# Patient Record
(demographics unavailable — no encounter records)

---

## 2025-02-18 NOTE — PAST MEDICAL HISTORY
[Menopause Age____] : age at menopause was [unfilled] [Total Preg ___] : G: [unfilled] [Live Births___] : P: [unfilled] [Full Term ___] : Full Term: [unfilled]  [Living ___] : Living: [unfilled]

## 2025-02-18 NOTE — HISTORY OF PRESENT ILLNESS
[FreeTextEntry1] : Fasting labs and medication renewal  [de-identified] : Ms. HUERTA is a 62 year-old-female, with a past medical history as noted below, who present to the office today for fasting labs and medication renewal.

## 2025-02-18 NOTE — COUNSELING
[AUDIT-C Screening administered and reviewed] : AUDIT-C Screening administered and reviewed [Benefits of weight loss discussed] : Benefits of weight loss discussed [Encouraged to increase physical activity] : Encouraged to increase physical activity [Encouraged to use exercise tracking device] : Encouraged to use exercise tracking device [Good understanding] : Patient has a good understanding of disease, goals and obesity follow-up plan [FreeTextEntry2] : 1600 mark daily [FreeTextEntry4] : 7

## 2025-02-18 NOTE — PHYSICAL EXAM
[No Acute Distress] : no acute distress [Well Nourished] : well nourished [Well Developed] : well developed [Well-Appearing] : well-appearing [Normal Sclera/Conjunctiva] : normal sclera/conjunctiva [PERRL] : pupils equal round and reactive to light [EOMI] : extraocular movements intact [Normal Outer Ear/Nose] : the outer ears and nose were normal in appearance [Normal Oropharynx] : the oropharynx was normal [Normal TMs] : both tympanic membranes were normal [No JVD] : no jugular venous distention [No Lymphadenopathy] : no lymphadenopathy [Supple] : supple [Thyroid Normal, No Nodules] : the thyroid was normal and there were no nodules present [No Respiratory Distress] : no respiratory distress  [No Accessory Muscle Use] : no accessory muscle use [Clear to Auscultation] : lungs were clear to auscultation bilaterally [Normal Rate] : normal rate  [Regular Rhythm] : with a regular rhythm [Normal S1, S2] : normal S1 and S2 [No Carotid Bruits] : no carotid bruits [No Abdominal Bruit] : a ~M bruit was not heard ~T in the abdomen [Pedal Pulses Present] : the pedal pulses are present [No Edema] : there was no peripheral edema [No Palpable Aorta] : no palpable aorta [No Extremity Clubbing/Cyanosis] : no extremity clubbing/cyanosis [Soft] : abdomen soft [Non Tender] : non-tender [Non-distended] : non-distended [No Masses] : no abdominal mass palpated [No HSM] : no HSM [Normal Bowel Sounds] : normal bowel sounds [Declined Rectal Exam] : declined rectal exam [Normal Posterior Cervical Nodes] : no posterior cervical lymphadenopathy [Normal Anterior Cervical Nodes] : no anterior cervical lymphadenopathy [No CVA Tenderness] : no CVA  tenderness [No Spinal Tenderness] : no spinal tenderness [No Joint Swelling] : no joint swelling [Grossly Normal Strength/Tone] : grossly normal strength/tone [No Rash] : no rash [Coordination Grossly Intact] : coordination grossly intact [No Focal Deficits] : no focal deficits [Normal Gait] : normal gait [Deep Tendon Reflexes (DTR)] : deep tendon reflexes were 2+ and symmetric [Speech Grossly Normal] : speech grossly normal [Normal Affect] : the affect was normal [Alert and Oriented x3] : oriented to person, place, and time [Normal Mood] : the mood was normal [Normal Insight/Judgement] : insight and judgment were intact [de-identified] : spider veins in lower extremities bilaterally, right worse than left  [de-identified] : as per GYN  [de-identified] : obese [FreeTextEntry1] : as per gyn  [de-identified] : advised to follow up with GYN -

## 2025-02-18 NOTE — DATA REVIEWED
[FreeTextEntry1] : reviewed prior labs reviewed bds  11/24 normal bds reviewed  mammo  birad 1   done 11/2024

## 2025-02-18 NOTE — ASSESSMENT
[FreeTextEntry1] : Ms. HUERTA is a 62 year female, with a past medical history as noted above, who present to the office today for Physical exam and flu shot as well as fasting blood work.

## 2025-02-18 NOTE — PLAN
[FreeTextEntry1] : Vascular  history of DVT/antiphospholipid antibody syndrome - Reviewed EKG NSR- continue Isaiah Lose Dose 81 mg p.o. as directed - continue CV exercise (walking).  Monitor FLP.   Cardio Screening for CAD - Reviewed EKG NSR - Continue ASA daily.   Gynecology Continue to follow up with OB/GYN annually - Advised clinical breast exam by GYN - Request copy of mammogram when completed in 11/24.  Request Pap smear results.  Gastroenterology screening colonoscopy - hx colon polyp - Advised patient importance of repeating colonoscopy.  Patient states she has a scheduled appointment with Dr. Perlman to discuss and to schedule her colonoscopy  Ophthalmology-history of retinal detachment.  Following ophthalmology for routine eye exams. next ov 11/24  Adult BMI screening and follow-up:  The patient's BMI is about 33.  Counseled patient regarding BMI, healthy eating, portion control and exercise importance of diet to maintain a healthy weight.  Counseled patient on importance of exercise to maintain a healthy weight. Discussed tracking calories with my fitness [employer]ina.   Immunization - Flu vaccination discussed. Education provided -  Fluzone 0.5 mL administered intramuscularly in the deltoid area.  See consent form for lot number and expiration date.  Administration of vaccine was given by Monica Koenig registered nurse.   Patient  education  - COVID-19   Counseling and education provided to the patient.  Advised sign and symptoms of the virus.  Advised contact precautions.  Educated patient on proper hand washing and to participate in social distancing.  Consider booster shot.  Patient is in full awareness of the plan and agrees to it.  All pt question was answered.    Dragon speech recognition software was used to create portions of this document.  An attempt at proofreading has been made to minimize errors please call if any questions arise.

## 2025-02-18 NOTE — REVIEW OF SYSTEMS
[Redness] : redness [Easy Bruising] : easy bruising [Negative] : Heme/Lymph [Fever] : no fever [Chills] : no chills [Fatigue] : no fatigue [Recent Change In Weight] : ~T no recent weight change [Pain] : no pain [Vision Problems] : no vision problems [Itching] : no itching [Earache] : no earache [Chest Pain] : no chest pain [Palpitations] : no palpitations [Leg Claudication] : no leg claudication [Lower Ext Edema] : no lower extremity edema [Shortness Of Breath] : no shortness of breath [Wheezing] : no wheezing [Cough] : no cough [Dyspnea on Exertion] : no dyspnea on exertion [Abdominal Pain] : no abdominal pain [Nausea] : no nausea [Heartburn] : no heartburn [Melena] : no melena [Dysuria] : no dysuria [Incontinence] : no incontinence [Nocturia] : no nocturia [Hematuria] : no hematuria [Frequency] : no frequency [Joint Pain] : no joint pain [Joint Swelling] : no joint swelling [Muscle Pain] : no muscle pain [Skin Rash] : no skin rash [Headache] : no headache [Dizziness] : no dizziness [Fainting] : no fainting [Memory Loss] : no memory loss [Unsteady Walking] : no ataxia [Insomnia] : no insomnia [Depression] : no depression [Easy Bleeding] : no easy bleeding [Swollen Glands] : no swollen glands [FreeTextEntry3] : Left eye gets red at the end of the day

## 2025-02-18 NOTE — HEALTH RISK ASSESSMENT
[Yes] : Yes [2 - 3 times a week (3 pts)] : 2 - 3  times a week (3 points) [1 or 2 (0 pts)] : 1 or 2 (0 points) [Never (0 pts)] : Never (0 points) [No] : In the past 12 months have you used drugs other than those required for medical reasons? No [No falls in past year] : Patient reported no falls in the past year [0] : 2) Feeling down, depressed, or hopeless: Not at all (0) [Never] : Never [de-identified] : Denies  [Audit-CScore] : 3 [de-identified] : Daily walking - > 10,000 daily  [de-identified] : 1200-calorie diet.  [[id number]] : 0